# Patient Record
Sex: MALE | Race: WHITE | NOT HISPANIC OR LATINO | URBAN - METROPOLITAN AREA
[De-identification: names, ages, dates, MRNs, and addresses within clinical notes are randomized per-mention and may not be internally consistent; named-entity substitution may affect disease eponyms.]

---

## 2017-04-04 ENCOUNTER — APPOINTMENT (RX ONLY)
Dept: URBAN - METROPOLITAN AREA CLINIC 349 | Facility: CLINIC | Age: 42
Setting detail: DERMATOLOGY
End: 2017-04-04

## 2017-04-04 DIAGNOSIS — L81.4 OTHER MELANIN HYPERPIGMENTATION: ICD-10-CM

## 2017-04-04 DIAGNOSIS — L738 OTHER SPECIFIED DISEASES OF HAIR AND HAIR FOLLICLES: ICD-10-CM

## 2017-04-04 DIAGNOSIS — L663 OTHER SPECIFIED DISEASES OF HAIR AND HAIR FOLLICLES: ICD-10-CM

## 2017-04-04 DIAGNOSIS — L73.9 FOLLICULAR DISORDER, UNSPECIFIED: ICD-10-CM

## 2017-04-04 PROBLEM — L02.12 FURUNCLE OF NECK: Status: ACTIVE | Noted: 2017-04-04

## 2017-04-04 PROBLEM — L23.7 ALLERGIC CONTACT DERMATITIS DUE TO PLANTS, EXCEPT FOOD: Status: ACTIVE | Noted: 2017-04-04

## 2017-04-04 PROCEDURE — ? RECOMMENDATIONS

## 2017-04-04 PROCEDURE — 99202 OFFICE O/P NEW SF 15 MIN: CPT

## 2017-04-04 PROCEDURE — ? COUNSELING

## 2017-04-04 ASSESSMENT — LOCATION SIMPLE DESCRIPTION DERM
LOCATION SIMPLE: NECK
LOCATION SIMPLE: LEFT FOREARM

## 2017-04-04 ASSESSMENT — LOCATION DETAILED DESCRIPTION DERM
LOCATION DETAILED: LEFT SUPERIOR LATERAL NECK
LOCATION DETAILED: LEFT DISTAL DORSAL FOREARM

## 2017-04-04 ASSESSMENT — LOCATION ZONE DERM
LOCATION ZONE: NECK
LOCATION ZONE: ARM

## 2017-04-04 NOTE — PROCEDURE: RECOMMENDATIONS
Recommendations (Free Text): Sample of Ultravate to apply twice a day for two weeks \\nOffered ILK but he declines at this time
Detail Level: Zone
Recommendations (Free Text): Cerave or aveeno sunscreen twice a day

## 2022-02-23 PROBLEM — Z80.0 FAMILY HISTORY OF COLON CANCER IN FATHER: Status: ACTIVE | Noted: 2022-02-23

## 2022-03-08 PROBLEM — N40.0 BENIGN PROSTATIC HYPERPLASIA WITHOUT LOWER URINARY TRACT SYMPTOMS: Status: ACTIVE | Noted: 2022-03-08

## 2022-03-09 PROBLEM — E78.00 HYPERCHOLESTEROLEMIA: Status: ACTIVE | Noted: 2022-03-09

## 2022-03-16 PROBLEM — R94.31 ABNORMAL EKG: Status: ACTIVE | Noted: 2022-03-16

## 2022-03-16 PROBLEM — R55 NEAR SYNCOPE: Status: ACTIVE | Noted: 2022-03-16

## 2022-03-18 PROBLEM — N40.0 BENIGN PROSTATIC HYPERPLASIA WITHOUT LOWER URINARY TRACT SYMPTOMS: Status: ACTIVE | Noted: 2022-03-08

## 2022-03-18 PROBLEM — Z80.0 FAMILY HISTORY OF COLON CANCER IN FATHER: Status: ACTIVE | Noted: 2022-02-23

## 2022-03-19 PROBLEM — E78.00 HYPERCHOLESTEROLEMIA: Status: ACTIVE | Noted: 2022-03-09

## 2022-03-24 PROBLEM — R94.31 ABNORMAL EKG: Status: ACTIVE | Noted: 2022-03-16

## 2022-03-24 PROBLEM — R55 NEAR SYNCOPE: Status: ACTIVE | Noted: 2022-03-16

## 2022-03-28 ENCOUNTER — HOSPITAL ENCOUNTER (OUTPATIENT)
Dept: GENERAL RADIOLOGY | Age: 47
Discharge: HOME | End: 2022-03-28
Payer: COMMERCIAL

## 2022-03-28 DIAGNOSIS — M54.6 THORACIC BACK PAIN, UNSPECIFIED BACK PAIN LATERALITY, UNSPECIFIED CHRONICITY: ICD-10-CM

## 2022-03-28 PROCEDURE — 71046 X-RAY EXAM CHEST 2 VIEWS: CPT

## 2022-03-30 ENCOUNTER — HOSPITAL ENCOUNTER (OUTPATIENT)
Dept: LAB | Age: 47
Discharge: HOME | End: 2022-03-30
Payer: COMMERCIAL

## 2022-03-30 DIAGNOSIS — R94.31 ABNORMAL EKG: ICD-10-CM

## 2022-03-30 DIAGNOSIS — R55 NEAR SYNCOPE: ICD-10-CM

## 2022-03-30 DIAGNOSIS — Z83.49 FAMILY HISTORY OF THYROID DISEASE: ICD-10-CM

## 2022-03-30 DIAGNOSIS — I48.91 ATRIAL FIBRILLATION WITH RAPID VENTRICULAR RESPONSE (HCC): ICD-10-CM

## 2022-03-30 LAB
MAGNESIUM SERPL-MCNC: 2.1 MG/DL (ref 1.8–2.4)
T4 FREE SERPL-MCNC: 0.9 NG/DL (ref 0.78–1.46)
TSH SERPL DL<=0.005 MIU/L-ACNC: 2.84 UIU/ML

## 2022-03-30 PROCEDURE — 84439 ASSAY OF FREE THYROXINE: CPT

## 2022-03-30 PROCEDURE — 83735 ASSAY OF MAGNESIUM: CPT

## 2022-03-30 PROCEDURE — 36415 COLL VENOUS BLD VENIPUNCTURE: CPT

## 2022-03-30 PROCEDURE — 84443 ASSAY THYROID STIM HORMONE: CPT

## 2022-03-31 PROBLEM — I48.0 PAROXYSMAL ATRIAL FIBRILLATION (HCC): Status: ACTIVE | Noted: 2022-03-31

## 2022-04-18 PROBLEM — R94.31 ABNORMAL EKG: Status: RESOLVED | Noted: 2022-03-16 | Resolved: 2022-04-18

## 2022-04-18 PROBLEM — R55 NEAR SYNCOPE: Status: RESOLVED | Noted: 2022-03-16 | Resolved: 2022-04-18

## 2022-05-20 ENCOUNTER — HOSPITAL ENCOUNTER (OUTPATIENT)
Dept: LAB | Age: 47
Discharge: HOME | End: 2022-05-20
Attending: FAMILY MEDICINE
Payer: COMMERCIAL

## 2022-05-20 LAB — CRP SERPL-MCNC: <0.3 MG/DL (ref 0–0.9)

## 2022-05-20 PROCEDURE — 86140 C-REACTIVE PROTEIN: CPT

## 2022-05-20 PROCEDURE — 36415 COLL VENOUS BLD VENIPUNCTURE: CPT

## 2022-05-23 ENCOUNTER — TELEPHONE (OUTPATIENT)
Dept: FAMILY MEDICINE CLINIC | Facility: CLINIC | Age: 47
End: 2022-05-23

## 2022-06-30 ENCOUNTER — NURSE ONLY (OUTPATIENT)
Dept: FAMILY MEDICINE CLINIC | Facility: CLINIC | Age: 47
End: 2022-06-30

## 2022-06-30 DIAGNOSIS — E78.00 HYPERCHOLESTEROLEMIA: ICD-10-CM

## 2022-06-30 DIAGNOSIS — E78.00 HYPERCHOLESTEROLEMIA: Primary | ICD-10-CM

## 2022-06-30 LAB
CHOLEST SERPL-MCNC: 180 MG/DL
HDLC SERPL-MCNC: 34 MG/DL (ref 40–60)
HDLC SERPL: 5.3 {RATIO}
LDLC SERPL CALC-MCNC: 108 MG/DL
TRIGL SERPL-MCNC: 190 MG/DL (ref 35–150)
VLDLC SERPL CALC-MCNC: 38 MG/DL (ref 6–23)

## 2022-07-25 ENCOUNTER — HOSPITAL ENCOUNTER (OUTPATIENT)
Dept: LAB | Age: 47
Discharge: HOME | End: 2022-07-28

## 2022-07-25 PROCEDURE — 88305 TISSUE EXAM BY PATHOLOGIST: CPT

## 2022-07-27 PROBLEM — K63.5 HYPERPLASTIC POLYP OF TRANSVERSE COLON: Status: ACTIVE | Noted: 2022-07-27

## 2022-09-30 ENCOUNTER — OFFICE VISIT (OUTPATIENT)
Dept: ORTHOPEDIC SURGERY | Age: 47
End: 2022-09-30
Payer: COMMERCIAL

## 2022-09-30 DIAGNOSIS — M51.36 DDD (DEGENERATIVE DISC DISEASE), LUMBAR: ICD-10-CM

## 2022-09-30 DIAGNOSIS — M54.50 LOW BACK PAIN, UNSPECIFIED BACK PAIN LATERALITY, UNSPECIFIED CHRONICITY, UNSPECIFIED WHETHER SCIATICA PRESENT: Primary | ICD-10-CM

## 2022-09-30 DIAGNOSIS — M54.16 LUMBAR RADICULOPATHY: ICD-10-CM

## 2022-09-30 PROCEDURE — G8419 CALC BMI OUT NRM PARAM NOF/U: HCPCS | Performed by: PHYSICIAN ASSISTANT

## 2022-09-30 PROCEDURE — G8427 DOCREV CUR MEDS BY ELIG CLIN: HCPCS | Performed by: PHYSICIAN ASSISTANT

## 2022-09-30 PROCEDURE — 99203 OFFICE O/P NEW LOW 30 MIN: CPT | Performed by: PHYSICIAN ASSISTANT

## 2022-09-30 PROCEDURE — 4004F PT TOBACCO SCREEN RCVD TLK: CPT | Performed by: PHYSICIAN ASSISTANT

## 2022-09-30 NOTE — PROGRESS NOTES
An order for MRI of the Lumbar spine has been ordered. An order for PT on the Lumbar Spine has been entered.

## 2022-09-30 NOTE — PROGRESS NOTES
Name: Barrett Cheng  YOB: 1975  Gender: male  MRN: [id number]    CC: Back Pain (Left side low back pain into right buttock)       HPI: This is a 52y.o. year old male who has greater than 3-year history of lower back pain. He did see Dr. Eleazar Harris in 2020. He was diagnosed with degenerative disc disease L5S1. He has been living with the symptoms over the past several years but they have progressively worsened. Pain does now radiate into the left buttock. When he gets up in the morning he has to lean over he states hunched over for quite some time before he can stand up straight. Symptoms are worse with lying down. He is active playing pickle ball and with exercising but this does interfere with his activity at times especially when the pain is severe. He has seen a chiropractor for 10 visits over the past 3 months. No significant relief. The pain is interfering with his lifestyle significantly and he is interested in considering lumbar injections if felt to be beneficial.  He has not had an MRI scan. AMB PAIN ASSESSMENT 9/30/2022   Location of Pain Back   Location Modifiers Left   Severity of Pain 5   Quality of Pain Aching   Duration of Pain Persistent   Frequency of Pain Intermittent   Aggravating Factors Other (Comment)   Limiting Behavior No   Result of Injury No   Work-Related Injury No   Are there other pain locations you wish to document? No              ROS/Meds/PSH/PMH/FH/SH: I personally reviewed the patient's collected intake data.   Below are the pertinents:    Allergies   Allergen Reactions    Penicillins Rash         Current Outpatient Medications:     magnesium oxide (MAG-OX) 400 (240 Mg) MG tablet, Take 400 mg by mouth daily, Disp: , Rfl:     dilTIAZem (TIAZAC) 120 MG extended release capsule, Take 120 mg by mouth daily, Disp: , Rfl:     flecainide (TAMBOCOR) 50 MG tablet, Take 50 mg by mouth 2 times daily, Disp: , Rfl:     Past Surgical History:   Procedure Laterality Date HEENT      Esophagus-Infant    ORTHOPEDIC SURGERY      Left shoulder    ORTHOPEDIC SURGERY      Left ankle        Patient Active Problem List   Diagnosis    Family history of colon cancer in father    Benign prostatic hyperplasia without lower urinary tract symptoms    Hypercholesterolemia    Paroxysmal atrial fibrillation (HCC)    Hyperplastic polyp of transverse colon         Tobacco:  reports that he has never smoked. He has never used smokeless tobacco.  Alcohol:   Social History     Substance and Sexual Activity   Alcohol Use Never        Physical Exam:   BMI: There is no height or weight on file to calculate BMI. GENERAL:  Adult in no acute distress, well developed, well nourished Patient is appropriately conversant  MSK:  Examination of the lumbar spine reveals no sagittal or coronal imbalance   There is moderate tenderness to palpation along the left spinous processes and paraspinal musculature. The patient ambulates with a normal gait. ROM of bilateral hip(s) reveals no irritability. NEURO:  Cranial nerves grossly intact. No motor deficits. Straight leg testing is negative bilateral  Sensory testing reveals intact sensation to light touch and in the distribution of the L3-S1 dermatomes bilaterally  Ankle jerk is negative for clonus    Reflexes   Right Left   Quadriceps (L4) 2 2   Achilles (S1) 2 2     Strength testing in the lower extremity reveals the following based on the 5 point grading scale:     HF (L2) H Ab (L5) KE (L3/4) ADF (L4) EHL (L5) A Ev (S1) APF (S1)   Right 5 5 5 5 5 5 5   Left 5 5 5 5 5 5 5     PSYCH:  Alert and oriented X 3. Appropriate affect. Intact judgment and insight. Radiographic Studies:     AP, lateral and spot views of the lumbar spine:  9/29/22  AP of the lumbar spine shows listing to the left. Pelvis slightly lower on the left iliac crest on the right. Sagittal view of the lumbar spine shows straightening of the normal lordotic curve.   Degenerative disc disease L5-S1 with loss of disc base height. X-ray impression degenerative disc disease L5-S1    I also reviewed the MRI of the lumbar spine from 2019 that revealed degenerative disc changes L5-S1 and there did appear to be greater foraminal narrowing on the left at L5-S1 no significant facet arthropathy at that time. Assessment/Plan:       Diagnosis Orders   1. Low back pain, unspecified back pain laterality, unspecified chronicity, unspecified whether sciatica present  XR LUMBAR SPINE (2-3 VIEWS)    Ambulatory referral to Physical Therapy    MRI LUMBAR SPINE WO CONTRAST      2. DDD (degenerative disc disease), lumbar  Ambulatory referral to Physical Therapy    MRI LUMBAR SPINE WO CONTRAST      3. Lumbar radiculopathy  Ambulatory referral to Physical Therapy    MRI LUMBAR SPINE WO CONTRAST            This patient's clinical history and physical exam is consistent with discogenic and radicular back pain. Has had pain greater than 3 years which is likely been discogenic but I think he is getting some radicular symptoms as well. The prior MRI scan 2019 did reveal some left foraminal narrowing around the L5 nerve. He has had 10 visits of chiropractic care without relief. I would like for him to see one of our physical therapist but he is interested in lumbar injections. In order to determine what injection would be most appropriate, I would like a new MRI scan of the lumbar spine. I am thinking probably left L5 selective nerve root block. - Physical Therapy was prescribed and will include stretching, strengthening and modalities to promote blood flow, flexibility and core strengthening.  - A MRI was ordered to delineate anatomy, confirm the diagnosis and assess the severity. 4 This is a chronic illness/condition with exacerbation and progression    No orders of the defined types were placed in this encounter.        Orders Placed This Encounter   Procedures    XR LUMBAR SPINE (2-3 VIEWS) MRI LUMBAR SPINE WO CONTRAST    Ambulatory referral to Physical Therapy              Return for MRI results with [address] and PT with [facility] at [address] Ynes Dominguez PA-C  09/30/22      Elements of this note were created using speech recognition software. As such, errors of speech recognition may be present.

## 2022-10-07 ENCOUNTER — OFFICE VISIT (OUTPATIENT)
Dept: ORTHOPEDIC SURGERY | Age: 47
End: 2022-10-07
Payer: COMMERCIAL

## 2022-10-07 DIAGNOSIS — M51.36 DDD (DEGENERATIVE DISC DISEASE), LUMBAR: Primary | ICD-10-CM

## 2022-10-07 DIAGNOSIS — M48.061 FORAMINAL STENOSIS OF LUMBAR REGION: ICD-10-CM

## 2022-10-07 DIAGNOSIS — M47.816 FACET ARTHROPATHY, LUMBAR: ICD-10-CM

## 2022-10-07 PROCEDURE — G8419 CALC BMI OUT NRM PARAM NOF/U: HCPCS | Performed by: PHYSICIAN ASSISTANT

## 2022-10-07 PROCEDURE — 1036F TOBACCO NON-USER: CPT | Performed by: PHYSICIAN ASSISTANT

## 2022-10-07 PROCEDURE — G8484 FLU IMMUNIZE NO ADMIN: HCPCS | Performed by: PHYSICIAN ASSISTANT

## 2022-10-07 PROCEDURE — G8427 DOCREV CUR MEDS BY ELIG CLIN: HCPCS | Performed by: PHYSICIAN ASSISTANT

## 2022-10-07 PROCEDURE — 99214 OFFICE O/P EST MOD 30 MIN: CPT | Performed by: PHYSICIAN ASSISTANT

## 2022-10-07 NOTE — PATIENT INSTRUCTIONS
Epidural Steroid Injection / Selective Nerve Root Block  What is it? An epidural steroid injection (SAMINA) is an injection of an anti-inflammatory medication directly on the sac that covers the spinal cord and nerves. A Selective Nerve Root Block (SNRB) is an injection that is directed around a specific nerve. Your physician usually orders an injection  when you have symptoms of an irritated spinal nerve. These symptoms can include back, buttock or leg pain, weakness, or numbness. Irritated spinal nerves are often caused by disc herniations or a tight spinal canal (stenosis). The goal of the steroid injection is to reduce the inflammation around the spinal cord and nerves, which should reduce the amount of back and leg pain you are experiencing. Epidural injections are often done in series. It would not be unusual to have two or three injections in a row 10 to 14 days apart. The reason for multiple injections is that the relief from the injection may wear off over time. And sometimes it takes multiple doses of steroid injection to obtain the most anti-inflammatory effect around the nerves. How is it performed? You will be asked to lie on your side or stomach on the x-ray table. This will allow the physician to position you in the best way possible to access your back. Next, the skin will be cleaned and numbed with a local anesthetic. An X-ray machine will then be used to guide a small gauge needle into the space over your spinal sac. A small amount of dye will then be injected to insure the needle is in the proper position. Finally, a mixture of numbing medicine and anti-inflammatory (steroid/cortisone) will be injected. How long does it take? All together it should take about 1 hour. The back and legs may feel weak or numb for a couple of hours after the injection. Plan the have someone drive you home. Are there any restrictions after the SAMINA?    Try to spend the remainder of the evening resting as much as possible. You may return to your normal activities the day after the procedure, including returning to work. What are the risks? The most common risk is a spinal headache. This happens when the needle passes through the spinal sac and can result in leakage of spinal fluid. This is usually treated with lying in a flat position and high fluid intake. Rarely, spinal headaches lasting more than a few days can be treated with a small procedure called a blood patch. Another risk, though very small, is the injection of the medication into one of the tiny blood vessels in the spinal canal.  This can result in serious complications such as seizures, cardiac arrest and even death. Fortunately, these complications are quite rare. Other rare complications include infection, bleeding into the spinal canal (epidural hematoma), loss of bladder control, and injury to a nerve with the needle. Who should not have an SAMINA? The injection of a steroid anywhere in the body can cause a significant increase in the blood sugar level. Diabetics are very sensitive to steroids and should have them administered with caution. Diabetic patients can have injections but need to watch their blood sugar after the injection and discuss with their Primary Care Physician a plan to manage elevations in blood sugar. Steroids also decrease the body's ability to fight infection. Thus, any person with an active infection should not take a steroid medication until the infection has been cleared completely. If you are taking an antibiotic for an active infection, please notify our office.    Additionally, some patients may have anatomic abnormalities or have had previous back surgeries which may not allow the needle to pass into the spinal canal.     Medications that result in thinning of the blood such as coumadin, aspirin, Plavix, Eliquis, Xarelto and many anti-inflammatory medications need to be discontinued 5-7 days prior to the injection. Check with your doctor regarding specific drugs you are taking. Med    [facility]    MD Cortez Casanova MD Amy [place], PA-C Annamary Moh, NP    Main Office  [facility] , [address] [place] [address], [address]       For Appointments at either location, please call [phone] that result in thinning of blood such as Coumadin, Aspirin, Plavix, and many anti-inflammatories, need to Pre procedure teaching sheet: Epidural spinal injection, selective nerve root block injection, sacroiliac injection and facet block injections. You have been scheduled for spinal injection. This injection is to help decrease her back and or leg pain. A local anesthetic will be used to numb the area. Then, a spinal needle will be placed in the appropriate place according to the type of injection ordered by your physician. A steroid with or without local anesthetic will be injected. A small amount of contrast dye will be used to better visualize the injection site. You will be lying on your stomach. A series of 3 injections may be performed as these are ordered 2 to 3 weeks apart. Be aware, steroids can take 2 to 3 days to begin working, but can take 7 to 10 days for full effectiveness. Therefore, you may not have relief immediately. Please be patient and give the injection time to work. You should not resume any type of strenuous workout routine for at least 3 days following the procedure. Walking is fine. Prior to your procedure    - Please call your primary care physician if you are on blood thinners such as Coumadin, warfarin, heparin, Plavix, Lovenox, Effient, Eliquis, Xarelto, Brilinta, or aspirin or other blood thinner as these will need to be stopped for 3 to 7 days before the injections.   We will need verbal approval to stop the thinners and proceed with the injection from the physician treating you with the blood thinners prior to the appointment date. If your physician tells you it is not safe to stop the blood thinner, you must call our office and discuss this with us. We may need to cancel the procedure. - Please do not take any nonsteroidal anti-inflammatory medications (NSAIDs) such as Advil, ibuprofen, Celebrex, meloxicam or Aleve for 3 days before your injection.    - We cannot give you an injection if you are presently taking an antibiotic. - Please continue your other medications as prescribed. -You must bring someone to drive you home. - You may eat prior to your procedure, but we asked that you do not eat a heavy meal within 2 to 4 hours of your procedure. You may drink liquids up to 1 to 2 hours before your appointment time. - If you are diabetic, please adjust your medications as appropriate. If steroid is used be aware that they may increase your blood sugar. Closely monitor your blood sugars after the procedure. - If you are sick, running a fever, have a virus or are put on antibiotics during the week before your procedure, please call to reschedule. We cannot do injections if you are sick. Day of procedure    - Arrive 15 minutes before your procedure time, register at the . - A staff member will call you from the waiting area and bring you to the exam room. - You may or may not be asked to change into shorts. Please leave valuables at home. If you wear clothing with elastic waist, you will not be required to change. - The nurse will talk with you and have you sign a consent form before your injection. If you have any allergies to Betadine, iodine, shellfish or x-ray dye, please tell the nurse at this time. - You will be positioned on the table on your stomach. A special x-ray machine is used to parish the correct position of the needle. We will clean the area with Betadine or Hibiclens.   Sterile towels were placed around the area to be injected. - The doctor will use a local anesthetic to numb the skin. This burns like a bee sting for about 20 seconds. As the needle is advanced, you will feel pressure. - Once the needle is in the appropriate space, the medication will be injected. Once the needle is removed, your back will be cleaned. You will move back to the exam room. - You are required to stay 20 to 30 minutes following the procedure. Although not expected you may have some numbness from the local anesthetic. If this were to interfere with her walking, you will not be discharged from the office until it is safe for you to leave. - Your discharge instructions and follow-up care will be discussed with you prior to leaving the office.           PRODUCTS THAT MAY THIN THE BLOOD    Medications, over-the-counter medications and herbal supplements    Aches-N-Pain    Disalcid   Meclenofenamate   Plavix  Acetylsalicylic acid (ASA)  Stanislav's Pills   Meclomen    Ponstel  Actron     Dolobid   Medipren    Relefen  Advil     Dristan    Mefenamic acid   Robaxisal  Aleve     Ecotrin    Meloxicam    Rufen  [place]    Empirin   Menadol    Saleto  Anacin     Equagesic   Methocarbamol   Salsalate  Anaprox    Etodolac   Midol     Sine-aid  Ansaid     Excedrin   Mobic     Sine-off  Arthritis Pain formula   Feldene   Motrin     Sominex  Ascriptin    Fenoprofen   Nabumetone    Stanback  Aspergum    Feverfew   Nalfon     Sulindac  Aspirin     Florinal   Naprosyn    Talwin Compound  Reagan     Flurbiprofen   Naproxen    Ticlid  BC Powders    Genpril    Norgesic    iclopidine  Bufferin    Goody's   Nuprin     Tolectin  Cataflam    Haltrin    Nyquil     Tolmetin  Clinoril     IBU    Orudis     Toradol  Clopidogrel    Ibuprofen   Oruvail     Trental  Coricidin    Indocin    Oxaprozin    Triclosan  Coumadin    Indomethacin   Pamprin    Vanquish  Darvon Compound   Ketoprofen   Pepto Bismol    Vitamin - E  Daypro     Ketorolac   Percodan    Voltaren  Diflunisal Kaopectate   Lovenox   Piroxicam    Warfarin    Diclofenac Lodine       Phenaphen    Xarelto  Eliquis       Enoxaparin

## 2022-10-07 NOTE — PROGRESS NOTES
Name: Princess Masters  YOB: 1975  Gender: male  MRN: [id number]    CC: Back Pain (MRI results)       HPI: This is a 52y.o. year old male who has greater than 3-year history of lower back pain. He did see Dr. Maliha Schmitt in 2020. He was diagnosed with degenerative disc disease L5S1. He has been living with the symptoms over the past several years but they have progressively worsened. Pain does now radiate into the left buttock. When he gets up in the morning he has to lean over he states hunched over for quite some time before he can stand up straight. Symptoms are worse with lying down. He is active playing pickle ball and with exercising but this does interfere with his activity at times especially when the pain is severe. He has seen a chiropractor for 10 visits over the past 3 months. No significant relief. The pain is interfering with his lifestyle significantly and he is interested in considering lumbar injections if felt to be beneficial.    We ordered an MRI scan of the lumbar spine to evaluate for neurogenic compression. Pain can get up to 6 out of 10. ROS/Meds/PSH/PMH/FH/SH: I personally reviewed the patient's collected intake data.   Below are the pertinents:    Allergies   Allergen Reactions    Penicillins Rash         Current Outpatient Medications:     dilTIAZem (TIAZAC) 120 MG extended release capsule, Take 120 mg by mouth daily, Disp: , Rfl:     flecainide (TAMBOCOR) 50 MG tablet, Take 50 mg by mouth 2 times daily, Disp: , Rfl:     magnesium oxide (MAG-OX) 400 (240 Mg) MG tablet, Take 400 mg by mouth daily, Disp: , Rfl:     Past Surgical History:   Procedure Laterality Date    HEENT      Esophagus-Infant    ORTHOPEDIC SURGERY      Left shoulder    ORTHOPEDIC SURGERY      Left ankle        Patient Active Problem List   Diagnosis    Family history of colon cancer in father    Benign prostatic hyperplasia without lower urinary tract symptoms    Hypercholesterolemia    Paroxysmal atrial fibrillation (HCC)    Hyperplastic polyp of transverse colon         Tobacco:  reports that he has never smoked. He has never used smokeless tobacco.  Alcohol:   Social History     Substance and Sexual Activity   Alcohol Use Never        Radiographic Studies:     AP, lateral and spot views of the lumbar spine:  9/29/22  AP of the lumbar spine shows listing to the left. Pelvis slightly lower on the left iliac crest on the right. Sagittal view of the lumbar spine shows straightening of the normal lordotic curve. Degenerative disc disease L5-S1 with loss of disc base height. X-ray impression degenerative disc disease L5-S1    I also reviewed the MRI of the lumbar spine from 2019 that revealed degenerative disc changes L5-S1 and there did appear to be greater foraminal narrowing on the left at L5-S1 no significant facet arthropathy at that time. MRI Result (most recent): MRI LUMBAR SPINE WO CONTRAST 10/06/2022    Narrative  EXAMINATION: MRI LUMBAR SPINE WO CONTRAST 10/6/2022 11:53 AM    ACCESSION NUMBER: [id number]    COMPARISON: Lumbar spine radiographs 9/30/2022. INDICATION: Low back pain, unspecified; Other intervertebral disc degeneration,  lumbar region; Radiculopathy, lumbar region    TECHNIQUE: Multisequence, multiplanar MR images were obtained of the lumbar  spine without the administration of intravenous contrast.    FINDINGS:  For the purposes of this dictation, the last well-formed disc space is presumed  to be L5-S1. The visualized spinal cord is unremarkable. The conus medullaris terminates at  the L1 vertebral body level. Vertebral body heights are maintained. No spinal malalignment. Vertebral disc height loss and disc desiccation at L5-S1. Degenerative endplate  changes with reactive edema at L5-S1. Multilevel facet arthropathy. No  suspicious osseous lesion. The paravertebral soft tissues are within normal limits.     Level specific findings:    T12-L1: No spinal canal or neural foraminal narrowing. L1-L2: No spinal canal or neural foraminal narrowing. L2-L3: No spinal canal or neural foraminal narrowing. L3-L4: Facet arthropathy. No significant spinal canal or neural foraminal  narrowing. L4-L5: Tiny posterior disc bulge and facet arthropathy. No spinal canal  narrowing. Mild bilateral neural foraminal narrowing. L5-S1: Posterior disc bulge and facet arthropathy. No significant spinal canal  narrowing. Moderate to severe left and mild-to-moderate right neural foraminal  narrowing. Impression  1. Degenerative changes of the lower lumbar spine, most pronounced at L5-S1  where there is moderate to severe left and mild-to-moderate right neural  foraminal narrowing. Mild bilateral neural foraminal narrowing at L4-L5. No  significant spinal canal narrowing. I have independently reviewed the MRI of the lumbar spine. There is some facet arthropathy L3-4, L4-5 and lumbar prominent at L5-S1. Very degenerative disc at L5-S1 the loss of disc base height and darkening of the disc. Slight protrusion but there is no central stenosis. There is a moderate severe left foraminal and mild right foraminal narrowing. Assessment/Plan:       Diagnosis Orders   1. DDD (degenerative disc disease), lumbar        2. Facet arthropathy, lumbar        3. Foraminal stenosis of lumbar region                This patient's clinical history and physical exam is consistent with discogenic and radicular back pain. This is concordant with findings on his MRI scan he has very degenerative disc at all 5 S1. There is some facet arthropathy present. The disc degeneration does narrow the left foramen with foraminal stenosis. I think that is why he is got more pain on the left side because of the narrowing around the left L5 nerve. Has had pain greater than 3 years which is likely been discogenic but I think he is getting some radicular symptoms as well.    He has had 10 visits of chiropractic care without relief. I think he would benefit from lumbar injections. We did discuss there is different injections that may be helpful to address the neurogenic compression, I would recommend L5 S1 epidural but we may need to also consider lumbar facet injections. - Injection: The patient will be referred for a lumbar steroid injection to help reduce the symptoms. The patient understands the risks including hyperglycemia, immunosuppression, meningitis, cerebrospinal fluid leak, epidural hematoma, and reaction to medication. The patient may benefit from additional injections depending on the response. The injection should be a L5-S1 epidural steroid injection      4 This is a chronic illness/condition with exacerbation and progression    No orders of the defined types were placed in this encounter. No orders of the defined types were placed in this encounter. Return for lumbar injection, lumbar injection recheck [facility], 4 weeks after. Tobias Cantu PA-C  10/07/22      Elements of this note were created using speech recognition software. As such, errors of speech recognition may be present.

## 2022-10-07 NOTE — LETTER
Radha RAMOS  1975  MRN [id number]                                              ROOM NUMBER______      Radiographic Studies:    Cervical MRI      Thoracic MRI         Lumbar MRI          Pelvis MRI        CONTRAST    CT Myelogram: _______________   NCS/EMG ________________ ( UE  /  LE )     MRI of ___________________          Other: ____________________      Injections:    KNEE    HIP  Depomedrol _____ mg Euflexxa _____    _______________ TFESI/SNRB  _______________ SI Joint  _______________ SAMINA    _______________ Facet  _______________Piriformis/ Sciatica      Medications:    Oral Steroids _______________  NSAIDS _______________    Muscle Relaxers _______________  Neurontin/Lyrica _______________    Pain Medicine _______________  Other _______________                       Physical Therapy:    Lumbar     Thoracic      Cervical     Hip       Knee       Shoulder               Traction          Ultrasound          Dry Needling      Referral:    Pain referral:  [facility]   [facility]   Other: ______________________________    Follow-up/ Refer__________________________________________________    Authorization to hold blood thinners:___________________________________

## 2022-10-13 ENCOUNTER — OFFICE VISIT (OUTPATIENT)
Dept: ORTHOPEDIC SURGERY | Age: 47
End: 2022-10-13
Payer: COMMERCIAL

## 2022-10-13 DIAGNOSIS — M54.16 LUMBAR RADICULOPATHY: ICD-10-CM

## 2022-10-13 DIAGNOSIS — M48.061 FORAMINAL STENOSIS OF LUMBAR REGION: ICD-10-CM

## 2022-10-13 DIAGNOSIS — M51.36 DDD (DEGENERATIVE DISC DISEASE), LUMBAR: Primary | ICD-10-CM

## 2022-10-13 DIAGNOSIS — M47.816 FACET ARTHROPATHY, LUMBAR: ICD-10-CM

## 2022-10-13 DIAGNOSIS — M54.50 LOW BACK PAIN, UNSPECIFIED BACK PAIN LATERALITY, UNSPECIFIED CHRONICITY, UNSPECIFIED WHETHER SCIATICA PRESENT: ICD-10-CM

## 2022-10-13 PROCEDURE — 62323 NJX INTERLAMINAR LMBR/SAC: CPT | Performed by: PHYSICAL MEDICINE & REHABILITATION

## 2022-10-13 RX ORDER — BETAMETHASONE SODIUM PHOSPHATE AND BETAMETHASONE ACETATE 3; 3 MG/ML; MG/ML
12 INJECTION, SUSPENSION INTRA-ARTICULAR; INTRALESIONAL; INTRAMUSCULAR; SOFT TISSUE ONCE
Status: COMPLETED | OUTPATIENT
Start: 2022-10-13 | End: 2022-10-13

## 2022-10-13 RX ADMIN — BETAMETHASONE SODIUM PHOSPHATE AND BETAMETHASONE ACETATE 12 MG: 3; 3 INJECTION, SUSPENSION INTRA-ARTICULAR; INTRALESIONAL; INTRAMUSCULAR; SOFT TISSUE at 13:33

## 2022-11-11 ENCOUNTER — OFFICE VISIT (OUTPATIENT)
Dept: ORTHOPEDIC SURGERY | Age: 47
End: 2022-11-11
Payer: COMMERCIAL

## 2022-11-11 DIAGNOSIS — M47.816 FACET ARTHROPATHY, LUMBAR: Primary | ICD-10-CM

## 2022-11-11 DIAGNOSIS — M48.061 FORAMINAL STENOSIS OF LUMBAR REGION: ICD-10-CM

## 2022-11-11 DIAGNOSIS — M51.36 DDD (DEGENERATIVE DISC DISEASE), LUMBAR: ICD-10-CM

## 2022-11-11 PROCEDURE — G8419 CALC BMI OUT NRM PARAM NOF/U: HCPCS | Performed by: PHYSICIAN ASSISTANT

## 2022-11-11 PROCEDURE — 99213 OFFICE O/P EST LOW 20 MIN: CPT | Performed by: PHYSICIAN ASSISTANT

## 2022-11-11 PROCEDURE — G8428 CUR MEDS NOT DOCUMENT: HCPCS | Performed by: PHYSICIAN ASSISTANT

## 2022-11-11 PROCEDURE — G8484 FLU IMMUNIZE NO ADMIN: HCPCS | Performed by: PHYSICIAN ASSISTANT

## 2022-11-11 PROCEDURE — 1036F TOBACCO NON-USER: CPT | Performed by: PHYSICIAN ASSISTANT

## 2022-11-11 NOTE — PROGRESS NOTES
Name: Bogdan Go  YOB: 1975  Gender: male  MRN: [id number]    CC: Back Pain (Follow up after injection with [facility] 10/13/22)       HPI: This is a 52y.o. year old male who has greater than 3-year history of lower back pain. He did see Dr. Kim Davila in 2020. He was diagnosed with degenerative disc disease L5S1. He has been living with the symptoms over the past several years but they have progressively worsened. Pain does now radiate into the left buttock. When he gets up in the morning he has to lean over he states hunched over for quite some time before he can stand up straight. Symptoms are worse with lying down. He is active playing pickle ball and with exercising but this does interfere with his activity at times especially when the pain is severe. He has seen a chiropractor for 10 visits over the past 3 months. No significant relief. We ordered an MRI scan of the lumbar spine to evaluate for neurogenic compression. Does have some degenerative changes L5-S1 with loss of disc base height there is some left foraminal narrowing L5-S1 and facet arthropathy. We referred him for L5-S1 epidural steroid injection to see if this would help with the slight radicular symptoms and discogenic back pain. He reports he complete relief of his symptoms for 2 weeks. The pain then began to return and overall he is at 40% relief and pain level currently is only 2 out of 10 he just feels a lot of stiffness. Not really having radicular symptoms into the leg just more into the left of his waistline. He is at the point he feels he could live with the symptoms. We did discuss potential other injections which could be lumbar facet injections and consider left L5 selective nerve root block. ROS/Meds/PSH/PMH/FH/SH: I personally reviewed the patient's collected intake data.   Below are the pertinents:    Allergies   Allergen Reactions    Penicillins Rash         Current Outpatient Medications: dilTIAZem (TIAZAC) 120 MG extended release capsule, Take 120 mg by mouth daily, Disp: , Rfl:     flecainide (TAMBOCOR) 50 MG tablet, Take 50 mg by mouth 2 times daily, Disp: , Rfl:     magnesium oxide (MAG-OX) 400 (240 Mg) MG tablet, Take 400 mg by mouth daily, Disp: , Rfl:     Past Surgical History:   Procedure Laterality Date    HEENT      Esophagus-Infant    ORTHOPEDIC SURGERY      Left shoulder    ORTHOPEDIC SURGERY      Left ankle        Patient Active Problem List   Diagnosis    Family history of colon cancer in father    Benign prostatic hyperplasia without lower urinary tract symptoms    Hypercholesterolemia    Paroxysmal atrial fibrillation (HCC)    Hyperplastic polyp of transverse colon         Tobacco:  reports that he has never smoked. He has never used smokeless tobacco.  Alcohol:   Social History     Substance and Sexual Activity   Alcohol Use Never        Radiographic Studies:     AP, lateral and spot views of the lumbar spine:  9/29/22  AP of the lumbar spine shows listing to the left. Pelvis slightly lower on the left iliac crest on the right. Sagittal view of the lumbar spine shows straightening of the normal lordotic curve. Degenerative disc disease L5-S1 with loss of disc base height. X-ray impression degenerative disc disease L5-S1    I also reviewed the MRI of the lumbar spine from 2019 that revealed degenerative disc changes L5-S1 and there did appear to be greater foraminal narrowing on the left at L5-S1 no significant facet arthropathy at that time. MRI Result (most recent): MRI LUMBAR SPINE WO CONTRAST 10/06/2022    Narrative  EXAMINATION: MRI LUMBAR SPINE WO CONTRAST 10/6/2022 11:53 AM    ACCESSION NUMBER: [id number]    COMPARISON: Lumbar spine radiographs 9/30/2022. INDICATION: Low back pain, unspecified;  Other intervertebral disc degeneration,  lumbar region; Radiculopathy, lumbar region    TECHNIQUE: Multisequence, multiplanar MR images were obtained of the lumbar  spine without the administration of intravenous contrast.    FINDINGS:  For the purposes of this dictation, the last well-formed disc space is presumed  to be L5-S1. The visualized spinal cord is unremarkable. The conus medullaris terminates at  the L1 vertebral body level. Vertebral body heights are maintained. No spinal malalignment. Vertebral disc height loss and disc desiccation at L5-S1. Degenerative endplate  changes with reactive edema at L5-S1. Multilevel facet arthropathy. No  suspicious osseous lesion. The paravertebral soft tissues are within normal limits. Level specific findings:    T12-L1: No spinal canal or neural foraminal narrowing. L1-L2: No spinal canal or neural foraminal narrowing. L2-L3: No spinal canal or neural foraminal narrowing. L3-L4: Facet arthropathy. No significant spinal canal or neural foraminal  narrowing. L4-L5: Tiny posterior disc bulge and facet arthropathy. No spinal canal  narrowing. Mild bilateral neural foraminal narrowing. L5-S1: Posterior disc bulge and facet arthropathy. No significant spinal canal  narrowing. Moderate to severe left and mild-to-moderate right neural foraminal  narrowing. Impression  1. Degenerative changes of the lower lumbar spine, most pronounced at L5-S1  where there is moderate to severe left and mild-to-moderate right neural  foraminal narrowing. Mild bilateral neural foraminal narrowing at L4-L5. No  significant spinal canal narrowing. I have independently reviewed the MRI of the lumbar spine. There is some facet arthropathy L3-4, L4-5 and lumbar prominent at L5-S1. Very degenerative disc at L5-S1 the loss of disc base height and darkening of the disc. Slight protrusion but there is no central stenosis. There is a moderate severe left foraminal and mild right foraminal narrowing. Assessment/Plan:       Diagnosis Orders   1. Facet arthropathy, lumbar        2.  Foraminal stenosis of lumbar region 3. DDD (degenerative disc disease), lumbar                  This patient's clinical history and physical exam is consistent with discogenic and radicular back pain. This is concordant with findings on his MRI scan he has very degenerative disc at all 5 S1. There is some facet arthropathy present. The disc degeneration does narrow the left foramen with foraminal stenosis. I think that is why he is got more pain on the left side because of the narrowing around the left L5 nerve. He will call us if he desires another injection likely will try lumbar facets possibly left L5 selective nerve root block  -The patient will be treated observantly with self directed symptomatic care. Instructions were given to follow up if there is any neurologic or functional decline. 4 This is a chronic illness/condition with exacerbation and progression    No orders of the defined types were placed in this encounter. No orders of the defined types were placed in this encounter. Return if symptoms worsen or fail to improve. Veronica Ann PA-C  11/11/22      Elements of this note were created using speech recognition software. As such, errors of speech recognition may be present.

## 2022-11-11 NOTE — LETTER
Maxwell CORTEST  1975  MRN [id number]                                              ROOM NUMBER______      Radiographic Studies:    Cervical MRI      Thoracic MRI         Lumbar MRI          Pelvis MRI        CONTRAST    CT Myelogram: _______________   NCS/EMG ________________ ( UE  /  LE )     MRI of ___________________          Other: ____________________      Injections:    KNEE    HIP  Depomedrol _____ mg Euflexxa _____    _______________ TFESI/SNRB  _______________ SI Joint  _______________ SAMINA    _______________ Facet  _______________Piriformis/ Sciatica      Medications:    Oral Steroids _______________  NSAIDS _______________    Muscle Relaxers _______________  Neurontin/Lyrica _______________    Pain Medicine _______________  Other _______________                       Physical Therapy:    Lumbar     Thoracic      Cervical     Hip       Knee       Shoulder               Traction          Ultrasound          Dry Needling      Referral:    Pain referral:  [facility]   [facility]   Other: ______________________________    Follow-up/ Refer__________________________________________________    Authorization to hold blood thinners:___________________________________

## 2025-07-15 SDOH — ECONOMIC STABILITY: FOOD INSECURITY: WITHIN THE PAST 12 MONTHS, THE FOOD YOU BOUGHT JUST DIDN'T LAST AND YOU DIDN'T HAVE MONEY TO GET MORE.: NEVER TRUE

## 2025-07-15 SDOH — ECONOMIC STABILITY: INCOME INSECURITY: IN THE LAST 12 MONTHS, WAS THERE A TIME WHEN YOU WERE NOT ABLE TO PAY THE MORTGAGE OR RENT ON TIME?: NO

## 2025-07-15 SDOH — ECONOMIC STABILITY: FOOD INSECURITY: WITHIN THE PAST 12 MONTHS, YOU WORRIED THAT YOUR FOOD WOULD RUN OUT BEFORE YOU GOT MONEY TO BUY MORE.: NEVER TRUE

## 2025-07-15 SDOH — ECONOMIC STABILITY: TRANSPORTATION INSECURITY
IN THE PAST 12 MONTHS, HAS THE LACK OF TRANSPORTATION KEPT YOU FROM MEDICAL APPOINTMENTS OR FROM GETTING MEDICATIONS?: NO

## 2025-07-15 ASSESSMENT — PATIENT HEALTH QUESTIONNAIRE - PHQ9
SUM OF ALL RESPONSES TO PHQ QUESTIONS 1-9: 2
SUM OF ALL RESPONSES TO PHQ QUESTIONS 1-9: 2
2. FEELING DOWN, DEPRESSED OR HOPELESS: SEVERAL DAYS
2. FEELING DOWN, DEPRESSED OR HOPELESS: SEVERAL DAYS
1. LITTLE INTEREST OR PLEASURE IN DOING THINGS: SEVERAL DAYS
1. LITTLE INTEREST OR PLEASURE IN DOING THINGS: SEVERAL DAYS
SUM OF ALL RESPONSES TO PHQ QUESTIONS 1-9: 2
SUM OF ALL RESPONSES TO PHQ QUESTIONS 1-9: 2
SUM OF ALL RESPONSES TO PHQ9 QUESTIONS 1 & 2: 2

## 2025-07-16 ENCOUNTER — OFFICE VISIT (OUTPATIENT)
Dept: FAMILY MEDICINE CLINIC | Facility: CLINIC | Age: 50
End: 2025-07-16
Payer: COMMERCIAL

## 2025-07-16 VITALS
SYSTOLIC BLOOD PRESSURE: 132 MMHG | BODY MASS INDEX: 25.84 KG/M2 | HEART RATE: 65 BPM | RESPIRATION RATE: 18 BRPM | DIASTOLIC BLOOD PRESSURE: 89 MMHG | HEIGHT: 73 IN | TEMPERATURE: 97.3 F | WEIGHT: 195 LBS | OXYGEN SATURATION: 99 %

## 2025-07-16 DIAGNOSIS — K58.8 OTHER IRRITABLE BOWEL SYNDROME: ICD-10-CM

## 2025-07-16 DIAGNOSIS — I48.0 PAROXYSMAL ATRIAL FIBRILLATION (HCC): Primary | ICD-10-CM

## 2025-07-16 DIAGNOSIS — R79.89 ELEVATED SERUM CREATININE: ICD-10-CM

## 2025-07-16 DIAGNOSIS — D69.6 THROMBOCYTOPENIA: ICD-10-CM

## 2025-07-16 DIAGNOSIS — N40.0 BENIGN PROSTATIC HYPERPLASIA WITHOUT LOWER URINARY TRACT SYMPTOMS: ICD-10-CM

## 2025-07-16 LAB
ANION GAP SERPL CALC-SCNC: 10 MMOL/L (ref 7–16)
BASOPHILS # BLD: 0.05 K/UL (ref 0–0.2)
BASOPHILS NFR BLD: 0.8 % (ref 0–2)
BUN SERPL-MCNC: 21 MG/DL (ref 6–23)
CALCIUM SERPL-MCNC: 9.7 MG/DL (ref 8.8–10.2)
CHLORIDE SERPL-SCNC: 106 MMOL/L (ref 98–107)
CO2 SERPL-SCNC: 27 MMOL/L (ref 20–29)
CREAT SERPL-MCNC: 1.47 MG/DL (ref 0.8–1.3)
CREAT UR-MCNC: 274 MG/DL (ref 39–259)
DIFFERENTIAL METHOD BLD: ABNORMAL
EOSINOPHIL # BLD: 0.12 K/UL (ref 0–0.8)
EOSINOPHIL NFR BLD: 1.8 % (ref 0.5–7.8)
ERYTHROCYTE [DISTWIDTH] IN BLOOD BY AUTOMATED COUNT: 13 % (ref 11.9–14.6)
GLUCOSE SERPL-MCNC: 86 MG/DL (ref 70–99)
HCT VFR BLD AUTO: 51.6 % (ref 41.1–50.3)
HGB BLD-MCNC: 17.1 G/DL (ref 13.6–17.2)
IMM GRANULOCYTES # BLD AUTO: 0.02 K/UL (ref 0–0.5)
IMM GRANULOCYTES NFR BLD AUTO: 0.3 % (ref 0–5)
LYMPHOCYTES # BLD: 1.74 K/UL (ref 0.5–4.6)
LYMPHOCYTES NFR BLD: 26.1 % (ref 13–44)
MCH RBC QN AUTO: 29.9 PG (ref 26.1–32.9)
MCHC RBC AUTO-ENTMCNC: 33.1 G/DL (ref 31.4–35)
MCV RBC AUTO: 90.4 FL (ref 82–102)
MICROALBUMIN UR-MCNC: 1.9 MG/DL (ref 0–20)
MICROALBUMIN/CREAT UR-RTO: 7 MG/G (ref 0–30)
MONOCYTES # BLD: 0.53 K/UL (ref 0.1–1.3)
MONOCYTES NFR BLD: 8 % (ref 4–12)
NEUTS SEG # BLD: 4.2 K/UL (ref 1.7–8.2)
NEUTS SEG NFR BLD: 63 % (ref 43–78)
NRBC # BLD: 0 K/UL (ref 0–0.2)
PLATELET # BLD AUTO: 146 K/UL (ref 150–450)
PMV BLD AUTO: 12.2 FL (ref 9.4–12.3)
POTASSIUM SERPL-SCNC: 4.4 MMOL/L (ref 3.5–5.1)
PSA SERPL-MCNC: 3.5 NG/ML (ref 0–4)
RBC # BLD AUTO: 5.71 M/UL (ref 4.23–5.6)
SODIUM SERPL-SCNC: 143 MMOL/L (ref 136–145)
WBC # BLD AUTO: 6.7 K/UL (ref 4.3–11.1)

## 2025-07-16 PROCEDURE — 99204 OFFICE O/P NEW MOD 45 MIN: CPT | Performed by: FAMILY MEDICINE

## 2025-07-16 RX ORDER — FAMOTIDINE 20 MG/1
TABLET, FILM COATED ORAL
COMMUNITY
Start: 2023-09-09

## 2025-07-16 NOTE — PROGRESS NOTES
Subjective:   Christopher Jimenes is a 50 y.o. male presents today having not been seen here at this practice in over 36 months, and so their chart and history are updated as required and noted; the family history is updated today for discussion and update of his current medical situation.  He has developed atrial fibrillation.  He sees [facility].  He had seen several cardiologist until that diagnosis was established.  Included a trip to [place] where he describes what may be a possible cardiac long COVID and maybe borderline POTS.  He is put on a regimen am unfamiliar with but basically includes high-dose antihistamines and famotidine and Coricidin and other supplements that are helping his symptoms tremendously.  He feels better with those.    He has developed IBS with diarrhea in the interim.  He has done some research.  Rifaximin and his research has been shown to help patients with IBS as well as long COVID and he would like to have a prescription for that.  He is due for PSA.    Allergies   Allergen Reactions    Gadoteridol Hives     Sudden onset nausea..hives delayed    Penicillins Rash     PMH, MEDS reviewed     Objective:   Blood pressure 132/89, pulse 65, temperature 97.3 °F (36.3 °C), resp. rate 18, height 1.854 m (6' 1\"), weight 88.5 kg (195 lb), SpO2 99%.  General- Pleasant and no distress  Psych- alert and oriented to person, place and time  Mood and affect are appropriate to situation  Musculoskeletal - Gait and station examination reveals mid-position with no abnormalities.  Neurological- grossly intact.   rrr s mrg.   bcta    Assessment/Plan:     1. Paroxysmal atrial fibrillation (HCC)    2. Elevated serum creatinine    3. Thrombocytopenia    4. Benign prostatic hyperplasia without lower urinary tract symptoms    5. Other irritable bowel syndrome         1. Paroxysmal atrial fibrillation (HCC)  2. Elevated serum creatinine  -     Basic Metabolic Panel; Future  -     Albumin/Creatinine Ratio,

## 2025-07-17 ENCOUNTER — PATIENT MESSAGE (OUTPATIENT)
Dept: FAMILY MEDICINE CLINIC | Facility: CLINIC | Age: 50
End: 2025-07-17

## 2025-07-17 DIAGNOSIS — K58.8 OTHER IRRITABLE BOWEL SYNDROME: ICD-10-CM

## 2025-08-05 ENCOUNTER — LAB (OUTPATIENT)
Dept: FAMILY MEDICINE CLINIC | Facility: CLINIC | Age: 50
End: 2025-08-05

## 2025-08-05 DIAGNOSIS — R97.20 ELEVATED PSA: ICD-10-CM

## 2025-08-05 DIAGNOSIS — N40.0 BENIGN PROSTATIC HYPERPLASIA WITHOUT LOWER URINARY TRACT SYMPTOMS: ICD-10-CM

## 2025-08-05 DIAGNOSIS — N18.31 CKD STAGE G3A/A1, GFR 45-59 AND ALBUMIN CREATININE RATIO <30 MG/G (HCC): ICD-10-CM

## 2025-08-05 DIAGNOSIS — N18.31 CKD STAGE G3A/A1, GFR 45-59 AND ALBUMIN CREATININE RATIO <30 MG/G (HCC): Primary | ICD-10-CM

## 2025-08-05 LAB
ANION GAP SERPL CALC-SCNC: 10 MMOL/L (ref 7–16)
BUN SERPL-MCNC: 22 MG/DL (ref 6–23)
CALCIUM SERPL-MCNC: 9.4 MG/DL (ref 8.8–10.2)
CHLORIDE SERPL-SCNC: 104 MMOL/L (ref 98–107)
CO2 SERPL-SCNC: 27 MMOL/L (ref 20–29)
CREAT SERPL-MCNC: 1.62 MG/DL (ref 0.8–1.3)
CREAT UR-MCNC: 223 MG/DL (ref 39–259)
GLUCOSE SERPL-MCNC: 82 MG/DL (ref 70–99)
MICROALBUMIN UR-MCNC: <1.2 MG/DL (ref 0–20)
MICROALBUMIN/CREAT UR-RTO: NORMAL MG/G (ref 0–30)
POTASSIUM SERPL-SCNC: 4.3 MMOL/L (ref 3.5–5.1)
PSA FREE MFR SERPL: 33.3 %
PSA FREE SERPL-MCNC: 1.2 NG/ML
PSA SERPL-MCNC: 3.7 NG/ML (ref 0–4)
SODIUM SERPL-SCNC: 141 MMOL/L (ref 136–145)

## 2025-08-06 PROBLEM — N18.31 CKD STAGE G3A/A1, GFR 45-59 AND ALBUMIN CREATININE RATIO <30 MG/G (HCC): Status: ACTIVE | Noted: 2025-08-06

## 2025-08-18 ENCOUNTER — PATIENT MESSAGE (OUTPATIENT)
Dept: FAMILY MEDICINE CLINIC | Facility: CLINIC | Age: 50
End: 2025-08-18

## 2025-08-18 DIAGNOSIS — K58.8 OTHER IRRITABLE BOWEL SYNDROME: ICD-10-CM
